# Patient Record
Sex: FEMALE | Race: WHITE | URBAN - METROPOLITAN AREA
[De-identification: names, ages, dates, MRNs, and addresses within clinical notes are randomized per-mention and may not be internally consistent; named-entity substitution may affect disease eponyms.]

---

## 2017-03-20 LAB
ABO/RH: NORMAL
ANTIBODY SCREEN: NORMAL
HEPATITIS C ANTIBODY INTERPRETATION: NORMAL

## 2017-03-21 LAB
BASOPHILS ABSOLUTE: 0 K/UL (ref 0–0.2)
BASOPHILS RELATIVE PERCENT: 0.3 %
EOSINOPHILS ABSOLUTE: 0.1 K/UL (ref 0–0.6)
EOSINOPHILS RELATIVE PERCENT: 2.4 %
HCT VFR BLD CALC: 34.6 % (ref 36–48)
HEMOGLOBIN: 11.9 G/DL (ref 12–16)
HEPATITIS B SURFACE ANTIGEN INTERPRETATION: ABNORMAL
HIV-1 AND HIV-2 ANTIBODIES: NORMAL
LYMPHOCYTES ABSOLUTE: 1.6 K/UL (ref 1–5.1)
LYMPHOCYTES RELATIVE PERCENT: 35.1 %
MCH RBC QN AUTO: 28.1 PG (ref 26–34)
MCHC RBC AUTO-ENTMCNC: 34.3 G/DL (ref 31–36)
MCV RBC AUTO: 81.9 FL (ref 80–100)
MONOCYTES ABSOLUTE: 0.3 K/UL (ref 0–1.3)
MONOCYTES RELATIVE PERCENT: 6.5 %
NEUTROPHILS ABSOLUTE: 2.6 K/UL (ref 1.7–7.7)
NEUTROPHILS RELATIVE PERCENT: 55.7 %
PDW BLD-RTO: 14.7 % (ref 12.4–15.4)
PLATELET # BLD: 209 K/UL (ref 135–450)
PMV BLD AUTO: 8.6 FL (ref 5–10.5)
RBC # BLD: 4.22 M/UL (ref 4–5.2)
RPR: ABNORMAL
RUBELLA ANTIBODY IGG: 250.7 IU/ML
WBC # BLD: 4.7 K/UL (ref 4–11)

## 2017-03-22 LAB — PARVOVIRUS B19 IGG ANTIBODY: 7.18 IV

## 2017-08-25 LAB
GLUCOSE CHALLENGE: 81 MG/DL
HCT VFR BLD CALC: 27.1 % (ref 36–48)
HEMOGLOBIN: 8.9 G/DL (ref 12–16)
MCH RBC QN AUTO: 26.3 PG (ref 26–34)
MCHC RBC AUTO-ENTMCNC: 32.9 G/DL (ref 31–36)
MCV RBC AUTO: 79.9 FL (ref 80–100)
PDW BLD-RTO: 14.2 % (ref 12.4–15.4)
PLATELET # BLD: 309 K/UL (ref 135–450)
PMV BLD AUTO: 8.3 FL (ref 5–10.5)
RBC # BLD: 3.4 M/UL (ref 4–5.2)
WBC # BLD: 6.5 K/UL (ref 4–11)

## 2020-10-02 ENCOUNTER — HOSPITAL ENCOUNTER (OUTPATIENT)
Dept: PHYSICAL THERAPY | Age: 30
Setting detail: THERAPIES SERIES
Discharge: HOME | End: 2020-10-02
Payer: OTHER GOVERNMENT

## 2020-10-02 PROCEDURE — 97161 PT EVAL LOW COMPLEX 20 MIN: CPT

## 2020-10-02 PROCEDURE — 97112 NEUROMUSCULAR REEDUCATION: CPT

## 2020-10-02 NOTE — PLAN OF CARE
Isrrael Valdez  Phone: [phone]   Fax:     [phone]                                                       Physical Therapy Certification    Dear Referring Practitioner: Dr. Mai Hurtado,    We had the pleasure of evaluating the following patient for physical therapy services at [address] [place] [address]. A summary of our findings can be found in the initial assessment below. This includes our plan of care. If you have any questions or concerns regarding these findings, please do not hesitate to contact me at the office phone number checked above. Thank you for the referral.       Physician Signature:_______________________________Date:__________________  By signing above (or electronic signature), therapists plan is approved by physician      Patient: Mazin Owusu   : 1990   MRN: [id number]  Referring Physician: Referring Practitioner: Dr. Mai Hurtado      Evaluation Date: 10/9/2020      Medical Diagnosis Information:  Diagnosis: R hip labral tear, L hikp impingmenet syndrome   Treatment Diagnosis: M25.551, M25.552                                         Insurance information: PT Insurance Information: [employer] Hung; ind ded met; 11. OOP met; [id number] not covered; $45 copay; 16 visits     Precautions/ Contra-indications: n/a  Latex Allergy:  [x]NO      []YES  Preferred Language for Healthcare:   [x]English       []other:    C-SSRS Triggered by Intake questionnaire (Past 2 wk assessment ):   [x] No, Questionnaire did not trigger screening.   [] Yes, Patient intake triggered C-SSRS Screening      [] C-SSRS Screening completed  [] PCP notified via Epic     SUBJECTIVE: Patient stated complaint: Pt presents for evaluation of bilateral hip pain, R > L.  Pt states this past March she took a walk around her neighborhood and did some abdominal strengthening and felt like she strained something in her hip, took time off and foam rolled and heated without any relief, attempted to lift but stopped in August 2/2 pain. States the past 3 weeks it has gotten significantly worse to the point it's difficult to fall asleep and waking her up in the middle of the night. Denies fatigue, unexplained weight loss, changes in bowel/bladder. 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 100 p! HIP Abd     HIP Ext     HIP IR 25 p! 25 p! HIP ER 40 p! 40 p! Knee ext 0 0   Knee Flex 125 125   Ankle PF     Ankle DF     Ankle In     Ankle Ev     Strength  LEFT RIGHT   HIP Flexors 4+ p! 4+ p! HIP Abductors 4- 4-   HIP Ext 4- 4-   Knee EXT (quad) 5 5   Knee Flex (HS) 5 5   Ankle DF 5 5   Ankle PF 5 5     Reflexes/Sensation:    [x]Dermatomes/Myotomes intact    [x]Reflexes equal and normal bilaterally   []Other:    Joint mobility: bilateral hip- guarded   []Normal    [x]Hypo   []Hyper    Palpation: TTP greater trochanter, glute musculature     Functional Mobility/Transfers: functional squat, painful    Posture: increased lordosis    Bandages/Dressings/Incisions: n/a    Gait: (include devices/WB status) decreased stride length, decreased push off    Orthopedic Special Tests:   + quadrant, AREN, SLR for neural tension, Scours                       [x] Patient history, allergies, meds reviewed. Medical chart reviewed. See intake form.      Review Of Systems (ROS):  [x]Performed Review of systems (Integumentary, CardioPulmonary, Neurological) by intake and observation. Intake form has been scanned into medical record. Patient has been instructed to contact their primary care physician regarding ROS issues if not already being addressed at this time. Co-morbidities/Complexities (which will affect course of rehabilitation):   [x]None           Arthritic conditions   []Rheumatoid arthritis (M05.9)  []Osteoarthritis (M19.91)   Cardiovascular conditions   []Hypertension (I10)  []Hyperlipidemia (E78.5)  []Angina pectoris (I20)  []Atherosclerosis (I70)  []CVA Musculoskeletal conditions   []Disc pathology   []Congenital spine pathologies   []Prior surgical intervention  []Osteoporosis (M81.8)  []Osteopenia (M85.8)   Endocrine conditions   []Hypothyroid (E03.9)  []Hyperthyroid Gastrointestinal conditions   []Constipation (W73.71)   Metabolic conditions   []Morbid obesity (E66.01)  []Diabetes type 1(E10.65) or 2 (E11.65)   []Neuropathy (G60.9)     Pulmonary conditions   []Asthma (J45)  []Coughing   []COPD (J44.9)   Psychological Disorders  []Anxiety (F41.9)  []Depression (F32.9)   []Other:   []Other:          Barriers to/and or personal factors that will affect rehab potential:              []Age  []Sex    []Smoker              []Motivation/Lack of Motivation                        []Co-Morbidities              []Cognitive Function, education/learning barriers              []Environmental, home barriers              []profession/work barriers  []past PT/medical experience  []other:  Justification:     Falls Risk Assessment (30 days):   [x] Falls Risk assessed and no intervention required.   [] Falls Risk assessed and Patient requires intervention due to being higher risk   TUG score (>12s at risk):     [] Falls education provided, including         ASSESSMENT:   Functional Impairments:     [x]Noted lumbar/proximal hip/LE hypomobility   [x]Decreased LE functional []Excellent   [x]Good    []Fair   []Poor    Physical Therapy Evaluation Complexity Justification  [x] A history of present problem with:  [x] no personal factors and/or comorbidities that impact the plan of care;  []1-2 personal factors and/or comorbidities that impact the plan of care  []3 personal factors and/or comorbidities that impact the plan of care  [x] An examination of body systems using standardized tests and measures addressing any of the following: body structures and functions (impairments), activity limitations, and/or participation restrictions;:  [x] a total of 1-2 or more elements   [] a total of 3 or more elements   [] a total of 4 or more elements   [x] A clinical presentation with:  [x] stable and/or uncomplicated characteristics   [] evolving clinical presentation with changing characteristics  [] unstable and unpredictable characteristics;   [x] Clinical decision making of [x] low, [] moderate, [] high complexity using standardized patient assessment instrument and/or measurable assessment of functional outcome. [x] EVAL (LOW) 68059 (typically 20 minutes face-to-face)  [] EVAL (MOD) 35402 (typically 30 minutes face-to-face)  [] EVAL (HIGH) 51673 (typically 45 minutes face-to-face)  [] RE-EVAL     PLAN:  Frequency/Duration:  1 days per week for 4 Weeks:  Interventions:  [x]  Therapeutic exercise including: strength training, ROM, for Lower extremity and core   [x]  NMR activation and proprioception for LE, Glutes and Core   [x]  Manual therapy as indicated for LE, Hip and spine to include: Dry Needling/IASTM, STM, PROM, Gr I-IV mobilizations, manipulation. [x] Modalities as needed that may include: thermal agents, E-stim, Biofeedback, US, iontophoresis as indicated  [x] Patient education on joint protection, postural re-education, activity modification, progression of HEP.     HEP instruction: (see scanned forms)    GOALS:  Patient stated goal: \"get back to working out and being able to walk/stand without pain\"   [] Progressing: [] Met: [x] Not Met: [] Adjusted    Therapist goals for Patient:   Short Term Goals: To be achieved in: 2 weeks  1. Independent in HEP and progression per patient tolerance, in order to prevent re-injury. [] Progressing: [] Met: [x] Not Met: [] Adjusted  2. Patient will have a decrease in pain to facilitate improvement in movement, function, and ADLs as indicated by Functional Deficits. [] Progressing: [] Met: [x] Not Met: [] Adjusted    Long Term Goals: To be achieved in: 4 weeks  1. Disability index score of 10% or less for the LEFS to assist with reaching prior level of function. [] Progressing: [] Met: [x] Not Met: [] Adjusted  2. Patient will demonstrate increased AROM to full hip flexion to allow for proper joint functioning as indicated by patients Functional Deficits. [] Progressing: [] Met: [x] Not Met: [] Adjusted  3. Patient will demonstrate an increase in Strength to good proximal hip strength and control, within 5lb HHD in LE to allow for proper functional mobility as indicated by patients Functional Deficits. [] Progressing: [] Met: [x] Not Met: [] Adjusted  4. Patient will return to daily functional activities without increased symptoms or restriction. [] Progressing: [] Met: [x] Not Met: [] Adjusted  5. Pt will be able to walk without increased symptoms or restrictions.     [] Progressing: [] Met: [x] Not Met: [] Adjusted     Electronically signed by:  Doris Cadena, PT

## 2020-10-06 NOTE — FLOWSHEET NOTE
[place]  [address] [facility] [address]  Phone: [phone] Fax: [phone]    Physical Therapy Treatment Note/ Progress Report:     Date:  10/9/2020    Patient Name:  Mazin Owusu    :  1990  MRN: [id number]  Restrictions/Precautions:    Medical/Treatment Diagnosis Information:  Diagnosis: R hip labral tear, L hikp impingmenet syndrome  · Treatment Diagnosis: M25.551, N19.262  Insurance/Certification information:  PT Insurance Information: [employer] ; ind ded met; 11. OOP met; [id number] not covered; $45 copay; 16 visits  Physician Information:  Referring Practitioner: Dr. Ernestine Lama of care signed (Y/N):     Date of Patient follow up with Physician:      Progress Report: [x]  Yes  []  No     Date Range for reporting period:  Beginning:  10/2/2020  Ending:      Progress report due (10 Rx/or 30 days whichever is less): 6768     Recertification due (POC duration/ or 90 days whichever is less): 10/31/2020     Visit # Insurance Allowable Auth Needed   1 45 []Yes   [x]No     Latex Allergy:  [x]NO      []YES  Preferred Language for Healthcare:   [x]English       []other:  Functional Scale: LEFS:   Date assessed:10/2/2020    Pain level:  4-6/10     SUBJECTIVE:  See eval    OBJECTIVE: See eval   Observation:    Test measurements:      RESTRICTIONS/PRECAUTIONS: n/a    Exercises/Interventions:     Therapeutic Ex (17823)   Min: Resistance Sets/sec Reps Notes/CUES   Retro Stepper/BIKE       Alter G       BFR       Sportcord March       3 way SLR       SAQ       Clam ABD       Hip Ext Gerhardt Reap       BOSU fwd/side lunge       BOSU squat       Leg Press Iso/Con/Ecc 0-       Cybex HS curl       TKE       Glute side walks       RDL       Slide Lunge       Slide HS eccentrics       Step ups/ecc step down       Swissball wall rolls- in SLS- hip drive       Quad hip ext/wall-ball rolls                     Manual Intervention (92598)  Min:       Knee mobs/PROM       Tib/Fem Mobs       Patella Mobs       Ankle mobs       Hip belt mobs   5' LAD          NMR re-education (87091)  Min: 20    CUES NEEDED   Martiniquais/Biofeedback 10/10       BFR       G. Med activation       Hip Ext full ROM/ G. Activation       Bosu Bal and Prop- G Med       Single leg stance/Balance/Prop       Bosu Retro G. Med act       Prone Hip froggers- sliders/elevated       Glute max activ   5'' 10    TrA activation  5'' 10                  Therapeutic Activity (68520)  Min:       Ladders       Plyos       Dynamic Balance                                Therapeutic Exercise and NMR EXR  [x] (62620) Provided verbal/tactile cueing for activities related to strengthening, flexibility, endurance, ROM for improvements in LE, proximal hip, and core control with self care, mobility, lifting, ambulation. [x] (33188) Provided verbal/tactile cueing for activities related to improving balance, coordination, kinesthetic sense, posture, motor skill, proprioception  to assist with LE, proximal hip, and core control in self care, mobility, lifting, ambulation and eccentric single leg control.      NMR and Therapeutic Activities:    [x] (35327 or 48145) Provided verbal/tactile cueing for activities related to improving balance, coordination, kinesthetic sense, posture, motor skill, proprioception and motor activation to allow for proper function of core, proximal hip and LE with self care and ADLs and functional mobility.   [] (60904) Gait Re-education- Provided training and instruction to the patient for proper LE, core and proximal hip recruitment and positioning and eccentric body weight control with ambulation re-education including up and down stairs     Home Exercise Program:    [x] (35674) Reviewed/Progressed HEP activities related to strengthening, flexibility, endurance, ROM of core, proximal hip and LE for functional self-care, mobility, lifting and ambulation/stair navigation   [] (43162)Reviewed/Progressed HEP activities related to improving balance, coordination, kinesthetic sense, posture, motor skill, proprioception of core, proximal hip and LE for self care, mobility, lifting, and ambulation/stair navigation      Manual Treatments:  PROM / STM / Oscillations-Mobs:  G-I, II, III, IV (PA's, Inf., Post.)  [x] (40300) Provided manual therapy to mobilize LE, proximal hip and/or LS spine soft tissue/joints for the purpose of modulating pain, promoting relaxation,  increasing ROM, reducing/eliminating soft tissue swelling/inflammation/restriction, improving soft tissue extensibility and allowing for proper ROM for normal function with self care, mobility, lifting and ambulation. Modalities:     [] GAME READY (VASO)- for significant edema, swelling, pain control. Charges:  Timed Code Treatment Minutes: 20   Total Treatment Minutes: 45      [x] EVAL (LOW) 03932 (typically 20 minutes face-to-face)  [] EVAL (MOD) 65668 (typically 30 minutes face-to-face)  [] EVAL (HIGH) 73074 (typically 45 minutes face-to-face)  [] RE-EVAL     [] GQ(35325) x     [] DRY NEEDLE 1 OR 2 MUSCLES  [x] NMR (75332) x  1  [] DRY NEEDLE 3+ MUSCLES  [] Manual (05503) x       [] TA (62270) x     [] Mech Traction (52304)  [] ES(attended) (47718)     [] ES (un) (43541):   [] VASO (59171)  [] Other:        GOALS:  Patient stated goal: \"get back to working out and being able to walk/stand without pain\"   [] Progressing: [] Met: [x] Not Met: [] Adjusted    Therapist goals for Patient:   Short Term Goals: To be achieved in: 2 weeks  1. Independent in HEP and progression per patient tolerance, in order to prevent re-injury. [] Progressing: [] Met: [x] Not Met: [] Adjusted  2. Patient will have a decrease in pain to facilitate improvement in movement, function, and ADLs as indicated by Functional Deficits. [] Progressing: [] Met: [x] Not Met: [] Adjusted    Long Term Goals: To be achieved in: 4 weeks  1. Disability index score of 10% or less for the LEFS to assist with reaching prior level of function. [] Progressing: [] Met: [x] Not Met: [] Adjusted  2. Patient will demonstrate increased AROM to full hip flexion to allow for proper joint functioning as indicated by patients Functional Deficits. [] Progressing: [] Met: [x] Not Met: [] Adjusted  3. Patient will demonstrate an increase in Strength to good proximal hip strength and control, within 5lb HHD in LE to allow for proper functional mobility as indicated by patients Functional Deficits. [] Progressing: [] Met: [x] Not Met: [] Adjusted  4. Patient will return to daily functional activities without increased symptoms or restriction. [] Progressing: [] Met: [x] Not Met: [] Adjusted  5. Pt will be able to walk without increased symptoms or restrictions. [] Progressing: [] Met: [x] Not Met: [] Adjusted    ASSESSMENT:  See eval            Treatment/Activity Tolerance:  [x] Patient tolerated treatment well [] Patient limited by fatique  [] Patient limited by pain  [] Patient limited by other medical complications  [] Other:     Overall Progression Towards Functional goals/ Treatment Progress Update:  [] Patient is progressing as expected towards functional goals listed. [] Progression is slowed due to complexities/Impairments listed. [] Progression has been slowed due to co-morbidities.   [x] Plan just implemented, too soon to assess goals progression <30days   [] Goals require adjustment due to lack of progress  [] Patient is not progressing as expected and requires additional follow up with physician  [] Other    Prognosis for POC: [x] Good [] Fair  [] Poor    Patient requires continued skilled intervention: [x] Yes  [] No        PLAN: See eval  [] Continue per plan of care [] Alter current plan (see comments)  [x] Plan of care initiated [] Hold pending MD visit [] Discharge    Electronically signed by: Nichole Marquez, PT    Note: If patient does not return for scheduled/recommended follow up visits, this note will serve as a discharge from care along with the most recent update on progress.

## 2020-10-09 ENCOUNTER — HOSPITAL ENCOUNTER (OUTPATIENT)
Dept: PHYSICAL THERAPY | Age: 30
Setting detail: THERAPIES SERIES
Discharge: HOME | End: 2020-10-09
Payer: OTHER GOVERNMENT

## 2020-10-09 PROCEDURE — 97032 APPL MODALITY 1+ESTIM EA 15: CPT

## 2020-10-09 PROCEDURE — 97112 NEUROMUSCULAR REEDUCATION: CPT

## 2020-10-09 PROCEDURE — 97140 MANUAL THERAPY 1/> REGIONS: CPT

## 2020-10-09 PROCEDURE — 20560 NDL INSJ W/O NJX 1 OR 2 MUSC: CPT

## 2020-10-09 NOTE — FLOWSHEET NOTE
[place] [address] [address] [place]  Phone: [phone] Fax: [phone]    Physical Therapy Treatment Note/ Progress Report:     Date:  10/9/2020    Patient Name:  Nanette Posey    :  1990  MRN: [id number]  Restrictions/Precautions:    Medical/Treatment Diagnosis Information:  Diagnosis: R hip labral tear, L hikp impingmenet syndrome  · Treatment Diagnosis: M25.551, D52.211  Insurance/Certification information:  PT Insurance Information: [employer] ; ind ded met; 11. OOP met; [id number] not covered; $45 copay; 16 visits  Physician Information:  Referring Practitioner: Dr. Destinee Wall of care signed (Y/N):     Date of Patient follow up with Physician:      Progress Report: []  Yes  [x]  No     Date Range for reporting period:  Beginning:  10/2/2020  Ending:      Progress report due (10 Rx/or 30 days whichever is less):      Recertification due (POC duration/ or 90 days whichever is less): 10/31/2020     Visit # Insurance Allowable Auth Needed   2 45 []Yes   [x]No     Latex Allergy:  [x]NO      []YES  Preferred Language for Healthcare:   [x]English       []other:  Functional Scale: LEFS:   Date assessed:10/2/2020    Pain level:  4/10     SUBJECTIVE:  States she tried to ride the bike but it made her more sore. Exercises going well and she feels like she is engaging core and glutes a lot better. States she gets 5-10 minutes of relief after foam rolling.      OBJECTIVE: See eval   Observation:    Test measurements:      RESTRICTIONS/PRECAUTIONS: n/a    Exercises/Interventions:     Therapeutic Ex (19989)   Min: Resistance Sets/sec Reps Notes/CUES   Retro Stepper/BIKE       Alter G       BFR       Sportcord March       3 way SLR       SAQ       Clam ABD       Hip Ext Curlie Bent       BOSU fwd/side lunge       BOSU squat       Leg Press Iso/Con/Ecc 0-       Cybex HS curl       TKE       Glute side walks RDL       Slide Lunge       Slide HS eccentrics       Step ups/ecc step down       Swissball wall rolls- in SLS- hip drive       Quad hip ext/wall-ball rolls                     Manual Intervention (43255)  Min:       Knee mobs/PROM       Tib/Fem Mobs       Patella Mobs       DN  15'  R glutes   Hip belt mobs   10 LAD; inferior glide   Lumbar mobs  8'     NMR re-education (88888)  Min: 20    CUES NEEDED   [place]ian/Biofeedback 10/10       BFR       G. Med activation       Hip Ext full ROM/ G. Activation       Bosu Bal and Prop- G Med       Single leg stance/Balance/Prop       Bosu Retro G. Med act       Prone Hip froggers- sliders/elevated       Glute max activ   5'' 10    TrA activation  5'' 10                  Therapeutic Activity (62742)  Min:       Ladders       Plyos       Dynamic Balance                                Therapeutic Exercise and NMR EXR  [x] (22637) Provided verbal/tactile cueing for activities related to strengthening, flexibility, endurance, ROM for improvements in LE, proximal hip, and core control with self care, mobility, lifting, ambulation. [x] (47898) Provided verbal/tactile cueing for activities related to improving balance, coordination, kinesthetic sense, posture, motor skill, proprioception  to assist with LE, proximal hip, and core control in self care, mobility, lifting, ambulation and eccentric single leg control.      NMR and Therapeutic Activities:    [x] (39672 or 13668) Provided verbal/tactile cueing for activities related to improving balance, coordination, kinesthetic sense, posture, motor skill, proprioception and motor activation to allow for proper function of core, proximal hip and LE with self care and ADLs and functional mobility.   [] (53108) Gait Re-education- Provided training and instruction to the patient for proper LE, core and proximal hip recruitment and positioning and eccentric body weight control with ambulation re-education including up and down stairs Home Exercise Program:    [x] (66899) Reviewed/Progressed HEP activities related to strengthening, flexibility, endurance, ROM of core, proximal hip and LE for functional self-care, mobility, lifting and ambulation/stair navigation   [] (97364)Reviewed/Progressed HEP activities related to improving balance, coordination, kinesthetic sense, posture, motor skill, proprioception of core, proximal hip and LE for self care, mobility, lifting, and ambulation/stair navigation      Manual Treatments:  PROM / STM / Oscillations-Mobs:  G-I, II, III, IV (PA's, Inf., Post.)  [x] (78548) Provided manual therapy to mobilize LE, proximal hip and/or LS spine soft tissue/joints for the purpose of modulating pain, promoting relaxation,  increasing ROM, reducing/eliminating soft tissue swelling/inflammation/restriction, improving soft tissue extensibility and allowing for proper ROM for normal function with self care, mobility, lifting and ambulation. Spoke with Dr. Raymond Harper  regarding the use of Dry Needling     Dry needling manual therapy: consisted on the placement of 4 needles in the following muscles:  glute med. A 60 mm needle was inserted, piston, rotated, and coned to produce intramuscular mobilization. These techniques were used to restore functional range of motion, reduce muscle spasm and induce healing in the corresponding musculature. (79504)  Clean Technique was utilized today while applying Dry needling treatment. The treatment sites where cleaned with 70% solution of  isopropyl alcohol . The PT washed their hands and utilized treatment gloves along with hand  prior to inserting the needles. All needles where removed and discarded in the appropriate sharps container. MD has given verbal and/or written approval for this treatment.      Attended low frequency (1-20Hz) electrical stimulation was utilized in conjunction with Dry Needling:  the Estim was manipulated between all above needles for a period of Progressing: [] Met: [x] Not Met: [] Adjusted  4. Patient will return to daily functional activities without increased symptoms or restriction. [] Progressing: [] Met: [x] Not Met: [] Adjusted  5. Pt will be able to walk without increased symptoms or restrictions. [] Progressing: [] Met: [x] Not Met: [] Adjusted    ASSESSMENT:  Good tolerance to treatment. Guarding noted L4-S1 w/ lumbar PA's. Significant tissue resistance and guarding noted in glute musculature, pt stating \"that relieves my pain. \" Given relief w/ STM, DN performed- discussed pathology, risks, benefits. Mild soreness reported after DN but pt states she felt like it was going to help relieve symptoms. Treatment/Activity Tolerance:  [x] Patient tolerated treatment well [] Patient limited by fatique  [] Patient limited by pain  [] Patient limited by other medical complications  [] Other:     Overall Progression Towards Functional goals/ Treatment Progress Update:  [] Patient is progressing as expected towards functional goals listed. [] Progression is slowed due to complexities/Impairments listed. [] Progression has been slowed due to co-morbidities. [x] Plan just implemented, too soon to assess goals progression <30days   [] Goals require adjustment due to lack of progress  [] Patient is not progressing as expected and requires additional follow up with physician  [] Other    Prognosis for POC: [x] Good [] Fair  [] Poor    Patient requires continued skilled intervention: [x] Yes  [] No        PLAN: See eval  [x] Continue per plan of care [] Alter current plan (see comments)  [x] Plan of care initiated [] Hold pending MD visit [] Discharge    Electronically signed by: Linda Yen PT    Note: If patient does not return for scheduled/recommended follow up visits, this note will serve as a discharge from care along with the most recent update on progress.

## 2020-10-16 ENCOUNTER — HOSPITAL ENCOUNTER (OUTPATIENT)
Dept: PHYSICAL THERAPY | Age: 30
Setting detail: THERAPIES SERIES
Discharge: HOME | End: 2020-10-16
Payer: OTHER GOVERNMENT

## 2020-10-16 PROCEDURE — 97140 MANUAL THERAPY 1/> REGIONS: CPT

## 2020-10-16 PROCEDURE — 97032 APPL MODALITY 1+ESTIM EA 15: CPT

## 2020-10-16 PROCEDURE — 20560 NDL INSJ W/O NJX 1 OR 2 MUSC: CPT

## 2020-10-16 NOTE — FLOWSHEET NOTE
[address] [place] [address]  Phone: [phone] Fax: [phone]    Physical Therapy Treatment Note/ Progress Report:     Date:  10/16/2020    Patient Name:  Chela Zamudio    :  1990  MRN: [id number]  Restrictions/Precautions:    Medical/Treatment Diagnosis Information:  Diagnosis: R hip labral tear, L hikp impingmenet syndrome  · Treatment Diagnosis: M25.551, V82.356  Insurance/Certification information:  PT Insurance Information: [employer] ; ind ded met; 11. OOP met; [id number] not covered; $45 copay; 16 visits  Physician Information:  Referring Practitioner: Dr. Arti Escudero of care signed (Y/N):     Date of Patient follow up with Physician:      Progress Report: []  Yes  [x]  No     Date Range for reporting period:  Beginning:  10/2/2020  Ending:      Progress report due (10 Rx/or 30 days whichever is less):      Recertification due (POC duration/ or 90 days whichever is less): 10/31/2020     Visit # Insurance Allowable Auth Needed   3 45 []Yes   [x]No     Latex Allergy:  [x]NO      []YES  Preferred Language for Healthcare:   [x]English       []other:  Functional Scale: LEFS:   Date assessed:10/2/2020    Pain level:  4/10     SUBJECTIVE:  States she had good relief from needling in her hip for several hours last week but then had to stand at work all weekend after. Also tried to work out lower legs and was really sore for not doing very much. Feels like the tightness from her back down her legs is limiting her ability to move and strength.      OBJECTIVE: See eval   Observation:    Test measurements:      RESTRICTIONS/PRECAUTIONS: n/a    Exercises/Interventions:     Therapeutic Ex (34080)   Min: Resistance Sets/sec Reps Notes/CUES   Retro Stepper/BIKE       Alter G       BFR       Sportcord March       3 way SLR       SAQ       Clam ABD       Hip Ext Cely BAÑUELOS fwd/side lunge BOSU squat       Leg Press Iso/Con/Ecc 0-       Cybex HS curl       TKE       Glute side walks       RDL       Slide Lunge       Slide HS eccentrics       Step ups/ecc step down       Swissball wall rolls- in SLS- hip drive       Quad hip ext/wall-ball rolls                     Manual Intervention (52823)  Min: 25       Knee mobs/PROM       IASTM  10'  Lumbar paraspinal, QL   Sidelying gap  5'     DN  15'  R glutes   Hip belt mobs   10 LAD; inferior glide   Lumbar mobs  10'     NMR re-education (74819)  Min:     CUES NEEDED   Citizen of [place]/Biofeedback 10/10       BFR       G. Med activation       Hip Ext full ROM/ G. Activation       Bosu Bal and Prop- G Med       Single leg stance/Balance/Prop       Bosu Retro G. Med act       Prone Hip froggers- sliders/elevated       Glute max activ   5'' 10    TrA activation  5'' 10                  Therapeutic Activity (11190)  Min:       Ladders       Plyos       Dynamic Balance                                Therapeutic Exercise and NMR EXR  [x] (39141) Provided verbal/tactile cueing for activities related to strengthening, flexibility, endurance, ROM for improvements in LE, proximal hip, and core control with self care, mobility, lifting, ambulation. [x] (18811) Provided verbal/tactile cueing for activities related to improving balance, coordination, kinesthetic sense, posture, motor skill, proprioception  to assist with LE, proximal hip, and core control in self care, mobility, lifting, ambulation and eccentric single leg control.      NMR and Therapeutic Activities:    [x] (21597 or 63867) Provided verbal/tactile cueing for activities related to improving balance, coordination, kinesthetic sense, posture, motor skill, proprioception and motor activation to allow for proper function of core, proximal hip and LE with self care and ADLs and functional mobility.   [] (92797) Gait Re-education- Provided training and instruction to the patient for proper LE, core and proximal hip recruitment and positioning and eccentric body weight control with ambulation re-education including up and down stairs     Home Exercise Program:    [x] (01733) Reviewed/Progressed HEP activities related to strengthening, flexibility, endurance, ROM of core, proximal hip and LE for functional self-care, mobility, lifting and ambulation/stair navigation   [] (02987)Reviewed/Progressed HEP activities related to improving balance, coordination, kinesthetic sense, posture, motor skill, proprioception of core, proximal hip and LE for self care, mobility, lifting, and ambulation/stair navigation      Manual Treatments:  PROM / STM / Oscillations-Mobs:  G-I, II, III, IV (PA's, Inf., Post.)  [x] (31618) Provided manual therapy to mobilize LE, proximal hip and/or LS spine soft tissue/joints for the purpose of modulating pain, promoting relaxation,  increasing ROM, reducing/eliminating soft tissue swelling/inflammation/restriction, improving soft tissue extensibility and allowing for proper ROM for normal function with self care, mobility, lifting and ambulation. Spoke with Dr. Severo Amaral  regarding the use of Dry Needling     Dry needling manual therapy: consisted on the placement of 6 needles in the following muscles: lumbar multifidus. A 60 mm needle was inserted, piston, rotated, and coned to produce intramuscular mobilization. These techniques were used to restore functional range of motion, reduce muscle spasm and induce healing in the corresponding musculature. (85514)  Clean Technique was utilized today while applying Dry needling treatment. The treatment sites where cleaned with 70% solution of  isopropyl alcohol . The PT washed their hands and utilized treatment gloves along with hand  prior to inserting the needles. All needles where removed and discarded in the appropriate sharps container. MD has given verbal and/or written approval for this treatment.      Attended low frequency (1-20Hz) electrical control, within 5lb HHD in LE to allow for proper functional mobility as indicated by patients Functional Deficits. [] Progressing: [] Met: [x] Not Met: [] Adjusted  4. Patient will return to daily functional activities without increased symptoms or restriction. [] Progressing: [] Met: [x] Not Met: [] Adjusted  5. Pt will be able to walk without increased symptoms or restrictions. [] Progressing: [] Met: [x] Not Met: [] Adjusted    ASSESSMENT:  Fair tolerance to treatment. Significant soft tissue restriction in lower lumbar region, mostly L4-5. Increased soreness after DN R > L L5 levels w/ twitch response noted in QL. Good relief w/ SL QL stretch. Iced lumbar down prior to leaving to assist w/ soreness levels. Pt presenting w/ radicular symptoms down R leg into shin, not present at end of session. Treatment/Activity Tolerance:  [x] Patient tolerated treatment well [] Patient limited by fatique  [] Patient limited by pain  [] Patient limited by other medical complications  [] Other:     Overall Progression Towards Functional goals/ Treatment Progress Update:  [] Patient is progressing as expected towards functional goals listed. [] Progression is slowed due to complexities/Impairments listed. [] Progression has been slowed due to co-morbidities.   [x] Plan just implemented, too soon to assess goals progression <30days   [] Goals require adjustment due to lack of progress  [] Patient is not progressing as expected and requires additional follow up with physician  [] Other    Prognosis for POC: [x] Good [] Fair  [] Poor    Patient requires continued skilled intervention: [x] Yes  [] No        PLAN: See eval  [x] Continue per plan of care [] Alter current plan (see comments)  [] Plan of care initiated [] Hold pending MD visit [] Discharge    Electronically signed by: Mima Regalado PT    Note: If patient does not return for scheduled/recommended follow up visits, this note will serve as a discharge from [facility] along with the most recent update on progress.

## 2020-10-23 ENCOUNTER — HOSPITAL ENCOUNTER (OUTPATIENT)
Dept: PHYSICAL THERAPY | Age: 30
Setting detail: THERAPIES SERIES
Discharge: HOME | End: 2020-10-23
Payer: OTHER GOVERNMENT

## 2020-10-23 PROCEDURE — 97110 THERAPEUTIC EXERCISES: CPT

## 2020-10-23 PROCEDURE — 97112 NEUROMUSCULAR REEDUCATION: CPT

## 2020-10-23 PROCEDURE — 97140 MANUAL THERAPY 1/> REGIONS: CPT

## 2020-10-23 NOTE — FLOWSHEET NOTE
[address] [place], [place] [address]  Phone: [phone] Fax: [phone]    Physical Therapy Treatment Note/ Progress Report:     Date:  10/23/2020    Patient Name:  Kenisha Singh    :  1990  MRN: [id number]  Restrictions/Precautions:    Medical/Treatment Diagnosis Information:  Diagnosis: R hip labral tear, L hikp impingmenet syndrome  · Treatment Diagnosis: M25.551, X34.596  Insurance/Certification information:  PT Insurance Information: [employer] ; ind ded met; 11. OOP met; [id number] not covered; $45 copay; 16 visits  Physician Information:  Referring Practitioner: Dr. Figueroa [facility] of care signed (Y/N):     Date of Patient follow up with Physician:      Progress Report: []  Yes  [x]  No     Date Range for reporting period:  Beginning:  10/2/2020  Ending:      Progress report due (10 Rx/or 30 days whichever is less):      Recertification due (POC duration/ or 90 days whichever is less): 10/31/2020     Visit # Insurance Allowable Auth Needed   4 45 []Yes   [x]No     Latex Allergy:  [x]NO      []YES  Preferred Language for Healthcare:   [x]English       []other:  Functional Scale: LEFS:   Date assessed:10/2/2020    Pain level:  3-4/10 low back; RLE referral symptoms into lateral lower leg    SUBJECTIVE:  States she had decreased sharp pain and tightness following DN but now feels like she is sore because she has more to control. Reports symptoms worsen especially when she is sitting and looks down, feels like she needs to stretch but can't get a good stretch.      OBJECTIVE:    Observation:    Test measurements:   + SLR w/ tibial bias at 50 deg; + slump; symptom centralization w/ extension preference      RESTRICTIONS/PRECAUTIONS: n/a    Exercises/Interventions:     Therapeutic Ex (85621)   Min: Resistance Sets/sec Reps Notes/CUES   Retro Stepper/BIKE       Alter G       BFR       Sportco posture, motor skill, proprioception and motor activation to allow for proper function of core, proximal hip and LE with self care and ADLs and functional mobility.   [] (29186) Gait Re-education- Provided training and instruction to the patient for proper LE, core and proximal hip recruitment and positioning and eccentric body weight control with ambulation re-education including up and down stairs     Home Exercise Program:    [x] (67363) Reviewed/Progressed HEP activities related to strengthening, flexibility, endurance, ROM of core, proximal hip and LE for functional self-care, mobility, lifting and ambulation/stair navigation   [] (33757)Reviewed/Progressed HEP activities related to improving balance, coordination, kinesthetic sense, posture, motor skill, proprioception of core, proximal hip and LE for self care, mobility, lifting, and ambulation/stair navigation      Manual Treatments:  PROM / STM / Oscillations-Mobs:  G-I, II, III, IV (PA's, Inf., Post.)  [x] (71680) Provided manual therapy to mobilize LE, proximal hip and/or LS spine soft tissue/joints for the purpose of modulating pain, promoting relaxation,  increasing ROM, reducing/eliminating soft tissue swelling/inflammation/restriction, improving soft tissue extensibility and allowing for proper ROM for normal function with self care, mobility, lifting and ambulation. Spoke with Dr. Gaby Massey  regarding the use of Dry Needling     Dry needling manual therapy: consisted on the placement of 6 needles in the following muscles: lumbar multifidus. A 60 mm needle was inserted, piston, rotated, and coned to produce intramuscular mobilization. These techniques were used to restore functional range of motion, reduce muscle spasm and induce healing in the corresponding musculature. (71572)  Clean Technique was utilized today while applying Dry needling treatment. The treatment sites where cleaned with 70% solution of  isopropyl alcohol .   The PT washed their hands and utilized treatment gloves along with hand  prior to inserting the needles. All needles where removed and discarded in the appropriate sharps container. MD has given verbal and/or written approval for this treatment. Attended low frequency (1-20Hz) electrical stimulation was utilized in conjunction with Dry Needling:  the Estim was manipulated between all above needles for a period of 10 min. at 2-4 volts. The low frequency electrical stimulation was used to help reduce muscle spasm and help to interrupt /Etna the pain cycle. (34335)       Modalities:     [] GAME READY (VASO)- for significant edema, swelling, pain control. Charges:  Timed Code Treatment Minutes: 40   Total Treatment Minutes: 40      [] EVAL (LOW) 79891 (typically 20 minutes face-to-face)  [] EVAL (MOD) 78968 (typically 30 minutes face-to-face)  [] EVAL (HIGH) 78672 (typically 45 minutes face-to-face)  [] RE-EVAL     [x] HZ(48473) x 1    [] DRY NEEDLE 1 OR 2 MUSCLES  [x] NMR (97819) x  1  [] DRY NEEDLE 3+ MUSCLES  [x] Manual (29699) x 1      [] TA (63047) x     [] Mech Traction (54888)  [] ES(attended) (96746)     [] ES (un) (28256):   [] VASO (81751)  [] Other: 89489      GOALS:  Patient stated goal: \"get back to working out and being able to walk/stand without pain\"   [] Progressing: [] Met: [x] Not Met: [] Adjusted    Therapist goals for Patient:   Short Term Goals: To be achieved in: 2 weeks  1. Independent in HEP and progression per patient tolerance, in order to prevent re-injury. [] Progressing: [] Met: [x] Not Met: [] Adjusted  2. Patient will have a decrease in pain to facilitate improvement in movement, function, and ADLs as indicated by Functional Deficits. [] Progressing: [] Met: [x] Not Met: [] Adjusted    Long Term Goals: To be achieved in: 4 weeks  1. Disability index score of 10% or less for the LEFS to assist with reaching prior level of function. [] Progressing: [] Met: [x] Not Met: [] Adjusted  2. Patient will demonstrate increased AROM to full hip flexion to allow for proper joint functioning as indicated by patients Functional Deficits. [] Progressing: [] Met: [x] Not Met: [] Adjusted  3. Patient will demonstrate an increase in Strength to good proximal hip strength and control, within 5lb HHD in LE to allow for proper functional mobility as indicated by patients Functional Deficits. [] Progressing: [] Met: [x] Not Met: [] Adjusted  4. Patient will return to daily functional activities without increased symptoms or restriction. [] Progressing: [] Met: [x] Not Met: [] Adjusted  5. Pt will be able to walk without increased symptoms or restrictions. [] Progressing: [] Met: [x] Not Met: [] Adjusted    ASSESSMENT:  Fair tolerance to treatment. Decreased tissue resistance in lumbar spine w/ improved mobility, still limites L4-S1, reported decreased back pain w/ R SI mobilizations. Centralization of radicular symptoms down R leg into shin w/ static prone lying and nerve glides. Spent 10 minutes educating pt on patho-anatomical presentation, verbalized good understanding of symptom presentation. Will continue to benefit from skilled PT to progress extension program, decrease neural tension, and improve functional tolerance. Treatment/Activity Tolerance:  [x] Patient tolerated treatment well [] Patient limited by fatique  [] Patient limited by pain  [] Patient limited by other medical complications  [] Other:     Overall Progression Towards Functional goals/ Treatment Progress Update:  [] Patient is progressing as expected towards functional goals listed. [] Progression is slowed due to complexities/Impairments listed. [] Progression has been slowed due to co-morbidities.   [x] Plan just implemented, too soon to assess goals progression <30days   [] Goals require adjustment due to lack of progress  [] Patient is not progressing as expected and requires additional follow up with physician  [] Other    Prognosis for POC: [x] Good [] Fair  [] Poor    Patient requires continued skilled intervention: [x] Yes  [] No        PLAN: See eval  [x] Continue per plan of care [] Alter current plan (see comments)  [] Plan of care initiated [] Hold pending MD visit [] Discharge    Electronically signed by: Doris Cadena PT    Note: If patient does not return for scheduled/recommended follow up visits, this note will serve as a discharge from care along with the most recent update on progress.

## 2020-10-30 ENCOUNTER — HOSPITAL ENCOUNTER (OUTPATIENT)
Dept: PHYSICAL THERAPY | Age: 30
Setting detail: THERAPIES SERIES
Discharge: HOME | End: 2020-10-30
Payer: OTHER GOVERNMENT

## 2020-10-30 ENCOUNTER — APPOINTMENT (OUTPATIENT)
Dept: PHYSICAL THERAPY | Age: 30
End: 2020-10-30
Payer: OTHER GOVERNMENT

## 2020-11-06 ENCOUNTER — HOSPITAL ENCOUNTER (OUTPATIENT)
Dept: PHYSICAL THERAPY | Age: 30
Setting detail: THERAPIES SERIES
Discharge: HOME | End: 2020-11-06
Payer: OTHER GOVERNMENT

## 2020-11-06 PROCEDURE — 97140 MANUAL THERAPY 1/> REGIONS: CPT

## 2020-11-06 PROCEDURE — 97112 NEUROMUSCULAR REEDUCATION: CPT

## 2020-11-06 NOTE — PLAN OF CARE
[address]  Phone: [phone] Fax: [phone]        Physical Therapy Re-Certification Plan of Care/MD UPDATE      Dear  Rocio Del Valle,    We had the pleasure of treating the following patient for physical therapy services at [address] [place] [address]. A summary of our findings can be found in the updated assessment below. This includes our plan of care. If you have any questions or concerns regarding these findings, please do not hesitate to contact me at the office phone number checked above.   Thank you for the referral.     Physician Signature:________________________________Date:__________________  By signing above (or electronic signature), therapists plan is approved by physician    Date Range Of Visits: 10/2/2020-2020  Total Visits to Date: 5  Overall Response to Treatment:   [x]Patient is responding well to treatment and improvement is noted with regards  to goals   [x]Patient should continue to improve in reasonable time if they continue HEP   []Patient has plateaued and is no longer responding to skilled PT intervention    []Patient is getting worse and would benefit from return to referring MD   []Patient unable to adhere to initial POC   []Other:       Physical Therapy Treatment Note/ Progress Report:     Date:  2020    Patient Name:  Mesha Marcial    :  1990  MRN: [id number]  Restrictions/Precautions:    Medical/Treatment Diagnosis Information:  Diagnosis: R hip labral tear, L hikp impingmenet syndrome  · Treatment Diagnosis: M25.551, W87.612  Insurance/Certification information:  PT Insurance Information: [employer]; ind ded met; 11. OOP met; [id number] not covered; $45 copay; 16 visits  Physician Information:  Referring Practitioner: Dr. Barbara Birmingham of care signed (Y/N):     Date of Patient follow up with Physician:      Progress Report: [] Yes  [x]  No     Date Range for reporting period:  Beginning:  10/2/2020  Ending:      Progress report due (10 Rx/or 30 days whichever is less): 85/3/0482     Recertification due (POC duration/ or 90 days whichever is less): 10/31/2020     Visit # Insurance Allowable Auth Needed   5 45 []Yes   [x]No     Latex Allergy:  [x]NO      []YES  Preferred Language for Healthcare:   [x]English       []other:  Functional Scale: LEFS: 41/80 ; VANESSA: 22% disability    Date assessed:11/6/2020    Pain level:  3/10 low back soreness; no leg symptoms, hip sore    SUBJECTIVE:  States she did have increased back pain for 1-2 weeks after started prone progressions but leg symptoms are much less. Also feels like hip is improving and she is able to stretch and move better without increased pain. Nerve floss doing much better. Improving tolerance to working out. Feels like she has turned a corner and is on the right track. OBJECTIVE:    Observation:    Test measurements:   + SLR w/ tibial bias at 50 deg; + slump; symptom centralization w/ extension preference      11/6/2020  ROM LEFT RIGHT   HIP Flex 110 110   HIP Abd       HIP Ext       HIP IR 30 30   HIP ER 40 40   Knee ext 0 0   Knee Flex 125 125   Ankle PF       Ankle DF       Ankle In       Ankle Ev       Strength  LEFT RIGHT   HIP Flexors 4+ p! 4+ p!    HIP Abductors 4 4   HIP Ext 4 4         RESTRICTIONS/PRECAUTIONS: n/a    Exercises/Interventions:     Therapeutic Ex (07581)   Min: Resistance Sets/sec Reps Notes/CUES   Retro Stepper/BIKE       Alter G       BFR       Sportcord March       3 way SLR       SAQ       Clam ABD       Hip Ext Hu Minks       BOSU fwd/side lunge       BOSU squat       Leg Press Iso/Con/Ecc 0-       Cybex HS curl       TKE       Glute side walks       RDL       Slide Lunge       Slide HS eccentrics       Step ups/ecc step down       Swissball wall rolls- in SLS- hip drive       Quad hip ext/wall-ball rolls       Pt education    patho-anatomical presentation; relation of symptoms from back to leg          Manual Intervention (90846)  Min: 25       Knee mobs/PROM       IASTM  10'  R TFL, IT band, glute med   Sidelying gap       DN       Hip belt mobs  10'  LAD; inferior glide, lateral   Lumbar mobs  5'  Prone PA R > L L4-S1   NMR re-education (51452)  Min:  20    CUES NEEDED   Maldivian/Biofeedback 10/10       Quadruped pelvic tilt  1 15 Ant/post pelvic tilt   G. Med activation       Hip Ext full ROM/ G. Activation       Bosu Bal and Prop- G Med       Single leg stance/Balance/Prop       Bosu Retro G. Med act       Prone press up On elbows 2 10    Glute max activ   5'' 10 HEP   TrA activation  5'' 10 HEP   Supine nerve glide w/ tibial bias  2 10    Prone lying  5'  Flat on stomach w/ roll under ankles    Therapeutic Activity (71098)  Min:       Ladders       Plyos       Dynamic Balance                                Therapeutic Exercise and NMR EXR  [x] (99501) Provided verbal/tactile cueing for activities related to strengthening, flexibility, endurance, ROM for improvements in LE, proximal hip, and core control with self care, mobility, lifting, ambulation. [x] (88721) Provided verbal/tactile cueing for activities related to improving balance, coordination, kinesthetic sense, posture, motor skill, proprioception  to assist with LE, proximal hip, and core control in self care, mobility, lifting, ambulation and eccentric single leg control.      NMR and Therapeutic Activities:    [x] (15825 or 22258) Provided verbal/tactile cueing for activities related to improving balance, coordination, kinesthetic sense, posture, motor skill, proprioception and motor activation to allow for proper function of core, proximal hip and LE with self care and ADLs and functional mobility.   [] (99965) Gait Re-education- Provided training and instruction to the patient for proper LE, core and proximal hip recruitment and positioning and eccentric body weight control with ambulation re-education including up and down stairs     Home Exercise Program:    [x] (69153) Reviewed/Progressed HEP activities related to strengthening, flexibility, endurance, ROM of core, proximal hip and LE for functional self-care, mobility, lifting and ambulation/stair navigation   [] (88892)Reviewed/Progressed HEP activities related to improving balance, coordination, kinesthetic sense, posture, motor skill, proprioception of core, proximal hip and LE for self care, mobility, lifting, and ambulation/stair navigation      Manual Treatments:  PROM / STM / Oscillations-Mobs:  G-I, II, III, IV (PA's, Inf., Post.)  [x] (93977) Provided manual therapy to mobilize LE, proximal hip and/or LS spine soft tissue/joints for the purpose of modulating pain, promoting relaxation,  increasing ROM, reducing/eliminating soft tissue swelling/inflammation/restriction, improving soft tissue extensibility and allowing for proper ROM for normal function with self care, mobility, lifting and ambulation. Spoke with Dr. Carlton Alexis  regarding the use of Dry Needling           Modalities:     [] GAME READY (VASO)- for significant edema, swelling, pain control. Charges:  Timed Code Treatment Minutes: 45   Total Treatment Minutes: 45      [] EVAL (LOW) 95559 (typically 20 minutes face-to-face)  [] EVAL (MOD) 26231 (typically 30 minutes face-to-face)  [] EVAL (HIGH) 26903 (typically 45 minutes face-to-face)  [] RE-EVAL     [] OU(02652) x    [] DRY NEEDLE 1 OR 2 MUSCLES  [x] NMR (58528) x  1  [] DRY NEEDLE 3+ MUSCLES  [x] Manual (20364) x 2      [] TA (63107) x     [] Mech Traction (86303)  [] ES(attended) (73261)     [] ES (un) (51713):   [] VASO (17174)  [] Other: 80554      GOALS:  Patient stated goal: \"get back to working out and being able to walk/stand without pain\"   [x] Progressing: [] Met: [] Not Met: [] Adjusted    Therapist goals for Patient:   Short Term Goals: To be achieved in: 2 weeks  1.  Independent in HEP and progression per patient tolerance, in order to prevent re-injury. [] Progressing: [x] Met: [] Not Met: [] Adjusted  2. Patient will have a decrease in pain to facilitate improvement in movement, function, and ADLs as indicated by Functional Deficits. [] Progressing: [x] Met: [] Not Met: [] Adjusted    Long Term Goals: To be achieved in: 4 weeks  1. Disability index score of 10% or less for the LEFS to assist with reaching prior level of function. [x] Progressing: [] Met: [] Not Met: [] Adjusted  2. Patient will demonstrate increased AROM to full hip flexion to allow for proper joint functioning as indicated by patients Functional Deficits. [x] Progressing: [] Met: [] Not Met: [] Adjusted  3. Patient will demonstrate an increase in Strength to good proximal hip strength and control, within 5lb HHD in LE to allow for proper functional mobility as indicated by patients Functional Deficits. [x] Progressing: [] Met: [] Not Met: [] Adjusted  4. Patient will return to daily functional activities without increased symptoms or restriction. [x] Progressing: [] Met: [x] Not Met: [] Adjusted  5. Pt will be able to walk without increased symptoms or restrictions. [x] Progressing: [] Met: [] Not Met: [] Adjusted    ASSESSMENT:  Improving tolerance to treatment. Significant improvement in functionals scale scores. Pt ambulating with improved speed and decreased antalgia. Mobility improving, slowly progressing extension based program. Will continue to benefit from extension based program and progressive functional stability. Treatment/Activity Tolerance:  [x] Patient tolerated treatment well [] Patient limited by fatique  [] Patient limited by pain  [] Patient limited by other medical complications  [] Other:     Overall Progression Towards Functional goals/ Treatment Progress Update:  [x] Patient is progressing as expected towards functional goals listed.     [] Progression is slowed due to complexities/Impairments listed. [] Progression has been slowed due to co-morbidities. [] Plan just implemented, too soon to assess goals progression <30days   [] Goals require adjustment due to lack of progress  [] Patient is not progressing as expected and requires additional follow up with physician  [] Other    Prognosis for POC: [x] Good [] Fair  [] Poor    Patient requires continued skilled intervention: [x] Yes  [] No        PLAN: See eval  [x] Continue per plan of care [] Alter current plan (see comments)  [] Plan of care initiated [] Hold pending MD visit [] Discharge    Electronically signed by: Qian Brannon PT    Note: If patient does not return for scheduled/recommended follow up visits, this note will serve as a discharge from care along with the most recent update on progress.

## 2020-11-20 ENCOUNTER — HOSPITAL ENCOUNTER (OUTPATIENT)
Dept: PHYSICAL THERAPY | Age: 30
Setting detail: THERAPIES SERIES
Discharge: HOME | End: 2020-11-20
Payer: OTHER GOVERNMENT

## 2020-11-20 PROCEDURE — 97140 MANUAL THERAPY 1/> REGIONS: CPT

## 2020-11-20 PROCEDURE — 20560 NDL INSJ W/O NJX 1 OR 2 MUSC: CPT

## 2020-11-20 PROCEDURE — 97032 APPL MODALITY 1+ESTIM EA 15: CPT

## 2020-11-20 PROCEDURE — 97112 NEUROMUSCULAR REEDUCATION: CPT

## 2020-11-20 NOTE — FLOWSHEET NOTE
[facility] [address]  Phone: [phone] Fax: [phone]      Physical Therapy Treatment Note/ Progress Report:     Date:  2020    Patient Name:  Natanael Blackburn    :  1990  MRN: [id number]  Restrictions/Precautions:    Medical/Treatment Diagnosis Information:  Diagnosis: R hip labral tear, L hikp impingmenet syndrome  · Treatment Diagnosis: M25.551, I40.605  Insurance/Certification information:  PT Insurance Information: [employer] ; ind ded met;  OOP met; [id number] not covered; $45 copay; 16 visits  Physician Information:  Referring Practitioner: Dr. aRza Foster of care signed (Y/N):     Date of Patient follow up with Physician:      Progress Report: []  Yes  [x]  No     Date Range for reporting period:  Beginning:  10/2/2020  Ending:      Progress report due (10 Rx/or 30 days whichever is less):      Recertification due (POC duration/ or 90 days whichever is less): 10/31/2020     Visit # Insurance Allowable Auth Needed   6 45 []Yes   [x]No     Latex Allergy:  [x]NO      []YES  Preferred Language for Healthcare:   [x]English       []other:  Functional Scale: LEFS: 41/80 ; VANESSA: 22% disability    Date assessed:2020    Pain level:  4/10 R > L hip pain; tight low back    SUBJECTIVE:  States leg symptoms have significantly improved but does have increased R > L hip pain and tight low back symptoms. Is still working out and doing core work that include sit ups and flexion based positions. OBJECTIVE:    Observation:    Test measurements:   + SLR w/ tibial bias at 50 deg; + slump; symptom centralization w/ extension preference      2020  ROM LEFT RIGHT   HIP Flex 110 110   HIP Abd       HIP Ext       HIP IR 30 30   HIP ER 40 40   Knee ext 0 0   Knee Flex 125 125   Ankle PF       Ankle DF       Ankle In       Ankle Ev       Strength  LEFT RIGHT   HIP Flexors 4+ p! 4+ p! HIP Abductors 4 4   HIP Ext 4 4         RESTRICTIONS/PRECAUTIONS: n/a    Exercises/Interventions:     Therapeutic Ex (95328)   Min: Resistance Sets/sec Reps Notes/CUES   Retro Stepper/BIKE       Alter G       BFR       Sportcord March       3 way SLR       SAQ       Clam ABD       Hip Ext Nataliya Dole       BOSU fwd/side lunge       BOSU squat       Leg Press Iso/Con/Ecc 0-       Cybex HS curl       TKE       Glute side walks       RDL       Slide Lunge       Slide HS eccentrics       Step ups/ecc step down       Swissball wall rolls- in SLS- hip drive       Quad hip ext/wall-ball rolls       Pt education    patho-anatomical presentation; relation of symptoms from back to leg          Manual Intervention (49277)  Min: 25       Knee mobs/PROM       IASTM    R TFL, IT band, glute med   Sidelying gap       DN  10'  R PSIS, sacral base   Hip belt mobs  10'  LAD; inferior glide, lateral   Lumbar mobs  5'  Prone PA R > L L4-S1   NMR re-education (22216)  Min:  10    CUES NEEDED   Vincentian/Biofeedback 10/10       Quadruped pelvic tilt  1 15 Ant/post pelvic tilt   G. Med activation       Hip Ext full ROM/ G. Activation       Bosu Bal and Prop- G Med       Single leg stance/Balance/Prop       Quadruped pelvic rock to table  1 10    Prone press up On elbows 2 10    Glute max activ   5'' 10 HEP   TrA activation  5'' 10 HEP   Supine nerve glide w/ tibial bias  2 10    Prone lying  5'  Flat on stomach w/ roll under ankles    Therapeutic Activity (22590)  Min:       Ladders       Plyos       Dynamic Balance                                Therapeutic Exercise and NMR EXR  [x] (88540) Provided verbal/tactile cueing for activities related to strengthening, flexibility, endurance, ROM for improvements in LE, proximal hip, and core control with self care, mobility, lifting, ambulation.   [x] (34452) Provided verbal/tactile cueing for activities related to improving balance, coordination, kinesthetic sense, posture, motor skill, proprioception  to assist with LE, proximal hip, and core control in self care, mobility, lifting, ambulation and eccentric single leg control. NMR and Therapeutic Activities:    [x] (52822 or 38420) Provided verbal/tactile cueing for activities related to improving balance, coordination, kinesthetic sense, posture, motor skill, proprioception and motor activation to allow for proper function of core, proximal hip and LE with self care and ADLs and functional mobility.   [] (22306) Gait Re-education- Provided training and instruction to the patient for proper LE, core and proximal hip recruitment and positioning and eccentric body weight control with ambulation re-education including up and down stairs     Home Exercise Program:    [x] (04350) Reviewed/Progressed HEP activities related to strengthening, flexibility, endurance, ROM of core, proximal hip and LE for functional self-care, mobility, lifting and ambulation/stair navigation   [] (56346)Reviewed/Progressed HEP activities related to improving balance, coordination, kinesthetic sense, posture, motor skill, proprioception of core, proximal hip and LE for self care, mobility, lifting, and ambulation/stair navigation      Manual Treatments:  PROM / STM / Oscillations-Mobs:  G-I, II, III, IV (PA's, Inf., Post.)  [x] (43505) Provided manual therapy to mobilize LE, proximal hip and/or LS spine soft tissue/joints for the purpose of modulating pain, promoting relaxation,  increasing ROM, reducing/eliminating soft tissue swelling/inflammation/restriction, improving soft tissue extensibility and allowing for proper ROM for normal function with self care, mobility, lifting and ambulation. Spoke with Dr. Sameera De La Vega  regarding the use of Dry Needling           Modalities:     [] GAME READY (VASO)- for significant edema, swelling, pain control.      Charges:  Timed Code Treatment Minutes: 45   Total Treatment Minutes: 55      [] EVAL (LOW) 79500 (typically 20 minutes face-to-face)  [] EVAL (MOD) 99513 (typically 30 minutes face-to-face)  [] EVAL (HIGH) 56672 (typically 45 minutes face-to-face)  [] RE-EVAL     [] GQ(98133) x    [x] DRY NEEDLE 1 OR 2 MUSCLES  [x] NMR (29690) x  1  [] DRY NEEDLE 3+ MUSCLES  [x] Manual (02972) x 1      [] TA (50127) x     [] Mech Traction (43502)  [x] ES(attended) (64006)     [] ES (un) (06918):   [] VASO (35219)  [] Other: 32989      GOALS:  Patient stated goal: \"get back to working out and being able to walk/stand without pain\"   [x] Progressing: [] Met: [] Not Met: [] Adjusted    Therapist goals for Patient:   Short Term Goals: To be achieved in: 2 weeks  1. Independent in HEP and progression per patient tolerance, in order to prevent re-injury. [] Progressing: [x] Met: [] Not Met: [] Adjusted  2. Patient will have a decrease in pain to facilitate improvement in movement, function, and ADLs as indicated by Functional Deficits. [] Progressing: [x] Met: [] Not Met: [] Adjusted    Long Term Goals: To be achieved in: 4 weeks  1. Disability index score of 10% or less for the LEFS to assist with reaching prior level of function. [x] Progressing: [] Met: [] Not Met: [] Adjusted  2. Patient will demonstrate increased AROM to full hip flexion to allow for proper joint functioning as indicated by patients Functional Deficits. [x] Progressing: [] Met: [] Not Met: [] Adjusted  3. Patient will demonstrate an increase in Strength to good proximal hip strength and control, within 5lb HHD in LE to allow for proper functional mobility as indicated by patients Functional Deficits. [x] Progressing: [] Met: [] Not Met: [] Adjusted  4. Patient will return to daily functional activities without increased symptoms or restriction. [x] Progressing: [] Met: [x] Not Met: [] Adjusted  5. Pt will be able to walk without increased symptoms or restrictions.     [x] Progressing: [] Met: [] Not Met: [] Adjusted    ASSESSMENT:  Mobilization to sacral base in nutated position relieve back and hip pain. Hip pain reproducible w/ mobilization to R PSIS, improved following DN. Progressed extension based program and reemphasized safe lifting and core techniques. Increased low back tightness expected w/ centralization of symptoms. Treatment/Activity Tolerance:  [x] Patient tolerated treatment well [] Patient limited by fatique  [] Patient limited by pain  [] Patient limited by other medical complications  [] Other:     Overall Progression Towards Functional goals/ Treatment Progress Update:  [x] Patient is progressing as expected towards functional goals listed. [] Progression is slowed due to complexities/Impairments listed. [] Progression has been slowed due to co-morbidities. [] Plan just implemented, too soon to assess goals progression <30days   [] Goals require adjustment due to lack of progress  [] Patient is not progressing as expected and requires additional follow up with physician  [] Other    Prognosis for POC: [x] Good [] Fair  [] Poor    Patient requires continued skilled intervention: [x] Yes  [] No        PLAN: See eval  [x] Continue per plan of care [] Alter current plan (see comments)  [] Plan of care initiated [] Hold pending MD visit [] Discharge    Electronically signed by: Dewayne Ortiz PT    Note: If patient does not return for scheduled/recommended follow up visits, this note will serve as a discharge from care along with the most recent update on progress.

## 2020-12-04 ENCOUNTER — HOSPITAL ENCOUNTER (OUTPATIENT)
Dept: PHYSICAL THERAPY | Age: 30
Setting detail: THERAPIES SERIES
Discharge: HOME | End: 2020-12-04
Payer: OTHER GOVERNMENT

## 2020-12-04 NOTE — FLOWSHEET NOTE
[facility]    Physical Therapy  Cancellation/No-show Note  Patient Name:  Elier Parker  :  1990   Date:  2020  Cancelled visits to date: 1  No-shows to date: 1    For today's appointment patient:  []  Cancelled  []  Rescheduled appointment  [x]  No-show     Reason given by patient:  []  Patient ill  []  Conflicting appointment  []  No transportation    []  Conflict with work  []  No reason given  []  Other:     Comments:  Called, stated she forgot she had a scheduling conflict with her children.      Electronically signed by:  Blake Castillo PT

## 2020-12-11 ENCOUNTER — HOSPITAL ENCOUNTER (OUTPATIENT)
Dept: PHYSICAL THERAPY | Age: 30
Setting detail: THERAPIES SERIES
Discharge: HOME | End: 2020-12-11
Payer: OTHER GOVERNMENT

## 2021-09-28 LAB
GONADOTROPIN, CHORIONIC (HCG) QUANT: <5 MIU/ML
HCT VFR BLD CALC: 33.5 % (ref 36–48)
HEMOGLOBIN: 10.6 G/DL (ref 12–16)
MCH RBC QN AUTO: 25.8 PG (ref 26–34)
MCHC RBC AUTO-ENTMCNC: 31.7 G/DL (ref 31–36)
MCV RBC AUTO: 81.6 FL (ref 80–100)
PDW BLD-RTO: 16.2 % (ref 12.4–15.4)
PLATELET # BLD: 317 K/UL (ref 135–450)
PMV BLD AUTO: 8.5 FL (ref 5–10.5)
RBC # BLD: 4.11 M/UL (ref 4–5.2)
TSH SERPL DL<=0.05 MIU/L-ACNC: 1.35 UIU/ML (ref 0.27–4.2)
WBC # BLD: 4.8 K/UL (ref 4–11)

## 2021-10-25 LAB
ORGANISM: ABNORMAL
URINE CULTURE, ROUTINE: ABNORMAL

## 2024-03-20 ENCOUNTER — HOSPITAL ENCOUNTER (OUTPATIENT)
Age: 34
Discharge: HOME | End: 2024-03-20
Payer: COMMERCIAL

## 2024-03-20 ENCOUNTER — HOSPITAL ENCOUNTER (OUTPATIENT)
Dept: GENERAL RADIOLOGY | Age: 34
Discharge: HOME | End: 2024-03-20
Payer: COMMERCIAL

## 2024-03-20 DIAGNOSIS — Z00.00 ROUTINE GENERAL MEDICAL EXAMINATION AT A HEALTH CARE FACILITY: ICD-10-CM

## 2024-03-20 PROCEDURE — 70220 X-RAY EXAM OF SINUSES: CPT
